# Patient Record
Sex: MALE | URBAN - METROPOLITAN AREA
[De-identification: names, ages, dates, MRNs, and addresses within clinical notes are randomized per-mention and may not be internally consistent; named-entity substitution may affect disease eponyms.]

---

## 2019-10-10 ENCOUNTER — OFFICE VISIT (OUTPATIENT)
Dept: URBAN - METROPOLITAN AREA CLINIC 23 | Facility: CLINIC | Age: 24
End: 2019-10-10
Payer: OTHER GOVERNMENT

## 2019-10-10 DIAGNOSIS — H18.603 KERATOCONUS, UNSPECIFIED, BILATERAL: Primary | ICD-10-CM

## 2019-10-10 PROCEDURE — 92002 INTRM OPH EXAM NEW PATIENT: CPT | Performed by: OPTOMETRIST

## 2019-10-10 ASSESSMENT — KERATOMETRY
OD: 49.13
OS: 42.25

## 2019-10-10 ASSESSMENT — INTRAOCULAR PRESSURE
OS: 12
OD: 12

## 2019-10-10 NOTE — IMPRESSION/PLAN
Impression: Keratoconus, unspecified, bilateral: H18.603. Plan: Discussed diagnosis in detail with patient. Recommended Scleral  CLs to help improve [facility] refer to Dr. Armando Saldivar. Reassured patient of current condition and treatment. Will continue to observe condition and or symptoms.